# Patient Record
(demographics unavailable — no encounter records)

---

## 2025-03-26 NOTE — ASSESSMENT
[FreeTextEntry1] : Health Care Maintenance - well visit - routine labs ordered; he will go to lab for fasting bw - depression screen negative - flu vaccine- declines - covid vaccines reports 2 doses, declines further doses  - tdap- recommend  - advised to get annual eye exams with optometry/ophthalmology, skin exams with dermatology, and dental exams - RTC for CPE in 1 year or sooner prn  FHx of CVA in mom and sister -discussed risk factors  -patient is a non-smoker, denies medical history other than sickle cell trait -labs as above   R wrist lump -chronic -US ordered

## 2025-03-26 NOTE — HISTORY OF PRESENT ILLNESS
[FreeTextEntry1] : cpe/est care [de-identified] : CRESCENCIO LEWIS is a 36 year M who presents for CPE/est care PMHx sickle cell trait   Feels well overall R wrist lump, first noticed 1 year ago, thinks it is smaller now. Denies pain, numbness/tingling/weakness.  Accompanied by wife
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
## 2025-03-26 NOTE — HEALTH RISK ASSESSMENT
[Yes] : Yes [2 - 4 times a month (2 pts)] : 2-4 times a month (2 points) [1 or 2 (0 pts)] : 1 or 2 (0 points) [Never (0 pts)] : Never (0 points) [No] : In the past 12 months have you used drugs other than those required for medical reasons? No [0] : 2) Feeling down, depressed, or hopeless: Not at all (0) [PHQ-2 Negative - No further assessment needed] : PHQ-2 Negative - No further assessment needed [Employed] : employed [] :  [# Of Children ___] : has [unfilled] children [Feels Safe at Home] : Feels safe at home [Fully functional (bathing, dressing, toileting, transferring, walking, feeding)] : Fully functional (bathing, dressing, toileting, transferring, walking, feeding) [Fully functional (using the telephone, shopping, preparing meals, housekeeping, doing laundry, using] : Fully functional and needs no help or supervision to perform IADLs (using the telephone, shopping, preparing meals, housekeeping, doing laundry, using transportation, managing medications and managing finances) [Never] : Never [Audit-CScore] : 2 [[id number]] : 0 [de-identified] : wife, sister, son [FreeTextEntry2] :  with [employer] of public [employer]